# Patient Record
Sex: MALE | URBAN - METROPOLITAN AREA
[De-identification: names, ages, dates, MRNs, and addresses within clinical notes are randomized per-mention and may not be internally consistent; named-entity substitution may affect disease eponyms.]

---

## 2017-01-31 ENCOUNTER — TRANSFERRED RECORDS (OUTPATIENT)
Dept: HEALTH INFORMATION MANAGEMENT | Facility: CLINIC | Age: 42
End: 2017-01-31

## 2017-02-02 ENCOUNTER — TRANSFERRED RECORDS (OUTPATIENT)
Dept: HEALTH INFORMATION MANAGEMENT | Facility: CLINIC | Age: 42
End: 2017-02-02

## 2017-02-22 ENCOUNTER — TELEPHONE (OUTPATIENT)
Dept: PEDIATRICS | Age: 42
End: 2017-02-22

## 2017-02-22 NOTE — TELEPHONE ENCOUNTER
Spoke with Barry today, he is requesting to schedule an appointment to be tested for rhabdomyolysis. Barry had 2 recent emergency department visits because of this. I asked Barry to send us records to be reviewed prior to scheduling. He will work on getting those to me; once received we will review to determine how best to schedule.

## 2017-03-06 ENCOUNTER — TELEPHONE (OUTPATIENT)
Dept: PEDIATRICS | Age: 42
End: 2017-03-06

## 2017-03-06 NOTE — TELEPHONE ENCOUNTER
Received Emergency Department records for episodes of rhabdomyolsis. I forwarded to our [facility] nurse care coordinator for review.

## 2017-03-07 ENCOUNTER — TELEPHONE (OUTPATIENT)
Dept: PEDIATRICS | Age: 42
End: 2017-03-07

## 2017-03-07 NOTE — TELEPHONE ENCOUNTER
Left a message for Barry offering an appointment 3/08/17 or 4/19/17. Provided my direct phone number for a return call for scheduling.

## 2017-03-07 NOTE — TELEPHONE ENCOUNTER
Records have been reviewed and patient is to be scheduled in the Metabolism Clinic. I attempted to reach Barry this morning but the phone went to dead air, I will try back again this morning to get him scheduled.

## 2017-03-08 ENCOUNTER — OFFICE VISIT (OUTPATIENT)
Dept: PEDIATRICS | Facility: CLINIC | Age: 42
End: 2017-03-08
Attending: GENETIC COUNSELOR, MS
Payer: COMMERCIAL

## 2017-03-08 ENCOUNTER — OFFICE VISIT (OUTPATIENT)
Dept: PEDIATRICS | Facility: CLINIC | Age: 42
End: 2017-03-08
Attending: PEDIATRICS
Payer: COMMERCIAL

## 2017-03-08 VITALS
HEIGHT: 75 IN | WEIGHT: 210.1 LBS | SYSTOLIC BLOOD PRESSURE: 110 MMHG | DIASTOLIC BLOOD PRESSURE: 84 MMHG | HEART RATE: 84 BPM | BODY MASS INDEX: 26.12 KG/M2

## 2017-03-08 DIAGNOSIS — R11.15 NON-INTRACTABLE CYCLICAL VOMITING WITH NAUSEA: ICD-10-CM

## 2017-03-08 DIAGNOSIS — M62.82 NON-TRAUMATIC RHABDOMYOLYSIS: Primary | ICD-10-CM

## 2017-03-08 DIAGNOSIS — R11.15 CYCLICAL VOMITING: ICD-10-CM

## 2017-03-08 LAB
ALBUMIN SERPL-MCNC: 3.5 G/DL (ref 3.4–5)
ALP SERPL-CCNC: 71 U/L (ref 40–150)
ALT SERPL W P-5'-P-CCNC: 42 U/L (ref 0–70)
ANION GAP SERPL CALCULATED.3IONS-SCNC: 6 MMOL/L (ref 3–14)
AST SERPL W P-5'-P-CCNC: 37 U/L (ref 0–45)
BILIRUB SERPL-MCNC: 2.3 MG/DL (ref 0.2–1.3)
BUN SERPL-MCNC: 10 MG/DL (ref 7–30)
CALCIUM SERPL-MCNC: 8.4 MG/DL (ref 8.5–10.1)
CHLORIDE SERPL-SCNC: 109 MMOL/L (ref 94–109)
CK SERPL-CCNC: 72 U/L (ref 30–300)
CO2 SERPL-SCNC: 24 MMOL/L (ref 20–32)
CREAT SERPL-MCNC: 1.12 MG/DL (ref 0.66–1.25)
CREAT UR-MCNC: 546 MG/DL
GFR SERPL CREATININE-BSD FRML MDRD: 71 ML/MIN/1.7M2
GLUCOSE SERPL-MCNC: 105 MG/DL (ref 70–99)
POTASSIUM SERPL-SCNC: 4.3 MMOL/L (ref 3.4–5.3)
PROT SERPL-MCNC: 7.5 G/DL (ref 6.8–8.8)
SODIUM SERPL-SCNC: 139 MMOL/L (ref 133–144)
T4 FREE SERPL-MCNC: 1.13 NG/DL (ref 0.76–1.46)
TSH SERPL DL<=0.05 MIU/L-ACNC: 2.52 MU/L (ref 0.4–4)

## 2017-03-08 PROCEDURE — 82550 ASSAY OF CK (CPK): CPT | Performed by: PEDIATRICS

## 2017-03-08 PROCEDURE — 99212 OFFICE O/P EST SF 10 MIN: CPT | Mod: ZF

## 2017-03-08 PROCEDURE — 83918 ORGANIC ACIDS TOTAL QUANT: CPT | Performed by: PEDIATRICS

## 2017-03-08 PROCEDURE — 96040 ZZH GENETIC COUNSELING, EACH 30 MINUTES: CPT | Mod: ZF | Performed by: GENETIC COUNSELOR, MS

## 2017-03-08 PROCEDURE — 84443 ASSAY THYROID STIM HORMONE: CPT | Performed by: PEDIATRICS

## 2017-03-08 PROCEDURE — 84439 ASSAY OF FREE THYROXINE: CPT | Performed by: PEDIATRICS

## 2017-03-08 PROCEDURE — 36415 COLL VENOUS BLD VENIPUNCTURE: CPT | Performed by: PEDIATRICS

## 2017-03-08 PROCEDURE — 80053 COMPREHEN METABOLIC PANEL: CPT | Performed by: PEDIATRICS

## 2017-03-08 PROCEDURE — 82085 ASSAY OF ALDOLASE: CPT | Performed by: PEDIATRICS

## 2017-03-08 RX ORDER — AMLODIPINE BESYLATE 5 MG/1
10 TABLET ORAL
COMMUNITY
Start: 2016-12-01

## 2017-03-08 RX ORDER — METHYLPHENIDATE HYDROCHLORIDE 36 MG/1
36 TABLET ORAL 2 TIMES DAILY
COMMUNITY
Start: 2016-08-05

## 2017-03-08 ASSESSMENT — PAIN SCALES - GENERAL: PAINLEVEL: NO PAIN (0)

## 2017-03-08 NOTE — NURSING NOTE
"Chief Complaint   Patient presents with     Consult     Rhabdomylosis       Initial /84 (BP Location: Left arm, Patient Position: Chair, Cuff Size: Adult Large)  Pulse 84  Ht 6' 3\" (190.5 cm)  Wt 210 lb 1.6 oz (95.3 kg)  HC 59.2 cm (23.31\")  BMI 26.26 kg/m2 Estimated body mass index is 26.26 kg/(m^2) as calculated from the following:    Height as of this encounter: 6' 3\" (190.5 cm).    Weight as of this encounter: 210 lb 1.6 oz (95.3 kg).  Medication Reconciliation: complete    "

## 2017-03-08 NOTE — MR AVS SNAPSHOT
After Visit Summary   3/8/2017    Barry Jenkins    MRN: [id number]           Patient Information     Date Of Birth          1975        Visit Information        Provider Department      3/8/2017 9:00 AM Sierra Hilario GC Peds Metabolism        Today's Diagnoses     Non-traumatic rhabdomyolysis    -  1    Cyclical vomiting        Encounter for genetic counseling        Non-intractable cyclical vomiting with nausea           Follow-ups after your visit        Who to contact     Please call your clinic at [phone] to:    Ask questions about your health    Make or cancel appointments    Discuss your medicines    Learn about your test results    Speak to your doctor   If you have compliments or concerns about an experience at your clinic, or if you wish to file a complaint, please contact [facility] Patient Relations at [phone] or email us at [email or web address]         Additional Information About Your Visit        MyChart Information     LUMOback is an electronic gateway that provides easy, online access to your medical records. With LUMOback, you can request a clinic appointment, read your test results, renew a prescription or communicate with your care team.     To sign up for Fabrust visit the website at [email or web address]   You will be asked to enter the access code listed below, as well as some personal information. Please follow the directions to create your username and password.     Your access code is: [email or web address]  Expires: 2017  9:55 AM     Your access code will  in 90 days. If you need help or a new code, please contact your [facility] or call [phone] for assistance.        Care EveryWhere ID     This is your Care EveryWhere ID. This could be used by other organizations to access your [facility] medical records  [id number]         Blood Pressure from Last 3 Encounters:   17 110/84     Weight from Last 3 Encounters:   03/08/17 210 lb 1.6 oz (95.3 kg)              We Performed the Following     Next Generation Sequencing        Primary Care Provider Office Phone # Fax #    Chris Moss [facility] [phone] [address] [facility] [address]        Thank you!     Thank you for choosing [facility]  for your care. Our goal is always to provide you with excellent care. Hearing back from our patients is one way we can continue to improve our services. Please take a few minutes to complete the written survey that you may receive in the mail after your visit with us. Thank you!             Your Updated Medication List - Protect others around you: Learn how to safely use, store and throw away your medicines at www.disposemymeds.org.          This list is accurate as of: 3/8/17 11:59 PM.  Always use your most recent med list.                   Brand Name Dispense Instructions for use    amLODIPine 5 MG tablet    NORVASC     10 mg       methylphenidate ER 36 MG CR tablet    CONCERTA     36 mg 2 times daily

## 2017-03-08 NOTE — LETTER
"  3/8/2017      RE: Barry Jenkins  [address] [address]       Presenting Information:  Barry Jenkins is a 42-year-old man with two recent episodes of rhabdomyolysis.  He was referred to the Genetics & Metabolism clinic at the [facility] to determine if there is an underlying cause for this.  I met with Mr. Jenkins at the request of Dr. Mike Guerrero to obtain a personal and family history, discuss the possible genetic contributions to rhabdomyolysis, and to obtain informed consent for genetic testing.    Personal History:  Barry was born in [place] and was healthy as a child and a young adult.  He has been active most of his life, including playing college soccer.  In November of 2016 he had an episode of rhabdomyolysis requiring hospitalization.  In January of this year he had another episode, with a CK of over 17,000.  Both of these episodes occurred after weight lifting  Several days prior to each of these episodes he began taking supplements including creatine, caffeine, and casein protein.  Barry did consume alcohol after his work-out on both of these occasions.  Barry additionally has a history of frequent vomiting, occurring approximately once a month.  There is no known trigger for this.       Family History:  A detailed pedigree was obtained and scanned into the electronic medical record.  It is significant for the following:    Barry is the older of two children his parents have together.  He has a 38-year-old sister who is healthy and has one healthy son.    Barry's mother is 69-years-old.  She has a history of breast cancer diagnosed at age 66.  She is currently doing well.    Barry has a maternal aunt who is \"slow\" and does not live independently.  A specific diagnosis is not known.    Barry's father is 69-years-old.  He had a heart attack at age 65 but has recovered well from this.    There is no family history of individuals with muscle weakness, cramps, rhabdomyolysis, " or other symptoms of a possible metabolic disease.    Barry is of [place] ancestry on both sides of the family.  Consanguinity was denied.      Discussion:  We discussed that Barry's history of rhabdomyolysis as well as what sounds to be cyclic vomiting is suggestive of a possible underlying metabolic condition.  Metabolic conditions occur due to differences in how the body uses and stores energy.  These are often genetic in nature.  We reviewed that genes are long stretches of DNA that are responsible for how our bodies look and how our bodies work.  We all have two copies of each gene; one inherited from the mother and one inherited from the father.  When there is a change, called a mutation, in the DNA sequence of a gene it can cause the signs and symptoms of a genetic condition.  There are several different genetic conditions that can cause an increased risk for rhabdomyolysis.    It can be important to know if there is an underlying genetic cause for Barry's rhabdomyolysis for several reasons. First and foremost, this can be important for his own health.  If we know the cause of these episodes we can better understand the treatment for the condition to help prevent future episodes, as well as how to better manage any future episodes.  Additionally, it is possible that an underlying cause may also predispose him to other health risks.  Knowing about these additional health risks can help us stay ahead of his healthcare to more appropriately screen for other complications.  Finally, discovering an underlying reason can help predict the chance for other family members to be affected.     After evaluation by Dr. Mike Guerrero, she recommended some general metabolic labs as well as genetic testing.  Genetic testing will include 25 genes associated with rhabdomyolysis.  This will be performed at the [facility].  We reviewed the costs, limitations, and benefits of testing and  Barry provided written informed consent for this. We also discussed insurance coverage for testing and on Barry's behalf we will submit a prior authorization for this testing prior to proceeding.  I also encouraged Barry to check on his deductible and co-insurance, as even if testing approved by his insurance company he may still be responsible for a portion of the cost.  Once started, results of the testing will be available in approximately 8-12 weeks and I will contact Barry when they return.      It was a pleasure meeting with Barry.  My contact information was shared with him should he have additional questions.      Plan:  1.  Metabolic labs  2.  I will submit a prior authorization for genetic testing  3.  Once approved, rhabdomyolysis gene panel through the [facility]  4.  Follow-up as determined by the results of the above testing      Sierra Hilario MS [facility]  Genetic Counselor  Division of Genetics and Metabolism    Total time spent in consultation with the family was approximately 30 minutes    Cc: No letter      Sierra Hilario GC

## 2017-03-08 NOTE — PROGRESS NOTES
"Presenting Information:  Barry Jenkins is a 42-year-old man with two recent episodes of rhabdomyolysis.  He was referred to the Genetics & Metabolism clinic at the [facility] to determine if there is an underlying cause for this.  I met with Mr. Jenkins at the request of Dr. Mike Guerrero to obtain a personal and family history, discuss the possible genetic contributions to rhabdomyolysis, and to obtain informed consent for genetic testing.    Personal History:  Barry was born in [place] and was healthy as a child and a young adult.  He has been active most of his life, including playing college soccer.  In November of 2016 he had an episode of rhabdomyolysis requiring hospitalization.  In January of this year he had another episode, with a CK of over 17,000.  Both of these episodes occurred after weight lifting  Several days prior to each of these episodes he began taking supplements including creatine, caffeine, and casein protein.  Barry did consume alcohol after his work-out on both of these occasions.  Barry additionally has a history of frequent vomiting, occurring approximately once a month.  There is no known trigger for this.       Family History:  A detailed pedigree was obtained and scanned into the electronic medical record.  It is significant for the following:    Barry is the older of two children his parents have together.  He has a 38-year-old sister who is healthy and has one healthy son.    Barry's mother is 69-years-old.  She has a history of breast cancer diagnosed at age 66.  She is currently doing well.    Barry has a maternal aunt who is \"slow\" and does not live independently.  A specific diagnosis is not known.    Barry's father is 69-years-old.  He had a heart attack at age 65 but has recovered well from this.    There is no family history of individuals with muscle weakness, cramps, rhabdomyolysis, or other symptoms of a possible metabolic disease.    Barry is of [place] ancestry on " both sides of the family.  Consanguinity was denied.      Discussion:  We discussed that Barry's history of rhabdomyolysis as well as what sounds to be cyclic vomiting is suggestive of a possible underlying metabolic condition.  Metabolic conditions occur due to differences in how the body uses and stores energy.  These are often genetic in nature.  We reviewed that genes are long stretches of DNA that are responsible for how our bodies look and how our bodies work.  We all have two copies of each gene; one inherited from the mother and one inherited from the father.  When there is a change, called a mutation, in the DNA sequence of a gene it can cause the signs and symptoms of a genetic condition.  There are several different genetic conditions that can cause an increased risk for rhabdomyolysis.    It can be important to know if there is an underlying genetic cause for Barry's rhabdomyolysis for several reasons. First and foremost, this can be important for his own health.  If we know the cause of these episodes we can better understand the treatment for the condition to help prevent future episodes, as well as how to better manage any future episodes.  Additionally, it is possible that an underlying cause may also predispose him to other health risks.  Knowing about these additional health risks can help us stay ahead of his healthcare to more appropriately screen for other complications.  Finally, discovering an underlying reason can help predict the chance for other family members to be affected.     After evaluation by Dr. Mike Guerrero, she recommended some general metabolic labs as well as genetic testing.  Genetic testing will include 25 genes associated with rhabdomyolysis.  This will be performed at the [facility].  We reviewed the costs, limitations, and benefits of testing and Barry provided written informed consent for this. We also discussed insurance coverage  for testing and on Barry's behalf we will submit a prior authorization for this testing prior to proceeding.  I also encouraged Barry to check on his deductible and co-insurance, as even if testing approved by his insurance company he may still be responsible for a portion of the cost.  Once started, results of the testing will be available in approximately 8-12 weeks and I will contact Barry when they return.      It was a pleasure meeting with Barry.  My contact information was shared with him should he have additional questions.      Plan:  1.  Metabolic labs  2.  I will submit a prior authorization for genetic testing  3.  Once approved, rhabdomyolysis gene panel through the [facility]  4.  Follow-up as determined by the results of the above testing      Sierra Hilario MS [facility]  Genetic Counselor  Division of Genetics and Metabolism    Total time spent in consultation with the family was approximately 30 minutes    Cc: No letter

## 2017-03-08 NOTE — LETTER
Date:March 10, 2017      Provider requested that no letter be sent. Do not send.       [facility] Health Information

## 2017-03-08 NOTE — MR AVS SNAPSHOT
"              After Visit Summary   3/8/2017    Barry Jenkins    MRN: [id number]           Patient Information     Date Of Birth          1975        Visit Information        Provider Department      3/8/2017 8:30 AM Mike Guerrero MD Peds Metabolism         Follow-ups after your visit        Who to contact     Please call your clinic at [phone] to:    Ask questions about your health    Make or cancel appointments    Discuss your medicines    Learn about your test results    Speak to your doctor   If you have compliments or concerns about an experience at your clinic, or if you wish to file a complaint, please contact [facility] Patient Relations at [phone] or email us at [email or web address]         Additional Information About Your Visit        MyChart Information     Sports.ws is an electronic gateway that provides easy, online access to your medical records. With Sports.ws, you can request a clinic appointment, read your test results, renew a prescription or communicate with your care team.     To sign up for Dovot visit the website at [email or web address]   You will be asked to enter the access code listed below, as well as some personal information. Please follow the directions to create your username and password.     Your access code is: [email or web address]  Expires: 2017  9:55 AM     Your access code will  in 90 days. If you need help or a new code, please contact your [facility] or call [phone] for assistance.        Care EveryWhere ID     This is your Care EveryWhere ID. This could be used by other organizations to access your [facility] medical records  [id number]        Your Vitals Were     Pulse Height Head Circumference BMI (Body Mass Index)          84 6' 3\" (190.5 cm) 59.2 cm (23.31\") 26.26 kg/m2         Blood Pressure from Last 3 Encounters:   17 110/84    Weight from Last 3 Encounters: "   03/08/17 210 lb 1.6 oz (95.3 kg)              Today, you had the following     No orders found for display       Primary Care Provider Office Phone # Fax #    Chris Moss [facility] [phone] [address] [facility] [address]        Thank you!     Thank you for choosing [facility]  for your care. Our goal is always to provide you with excellent care. Hearing back from our patients is one way we can continue to improve our services. Please take a few minutes to complete the written survey that you may receive in the mail after your visit with us. Thank you!             Your Updated Medication List - Protect others around you: Learn how to safely use, store and throw away your medicines at www.disposemymeds.org.          This list is accurate as of: 3/8/17  9:55 AM.  Always use your most recent med list.                   Brand Name Dispense Instructions for use    amLODIPine 5 MG tablet    NORVASC     10 mg       methylphenidate ER 36 MG CR tablet    CONCERTA     36 mg 2 times daily

## 2017-03-08 NOTE — LETTER
3/8/2017      RE: Barry Jenkins  [address] [facility] [address]       Pediatric Metabolic Initial Consultation    Patient: Barry Jenkins MRN# [id number]   YOB: 1975 Age: 42 year 1 month old   Date of Visit: Mar 8, 2017    To whom it may concern,    I had the pleasure of seeing your patient, Barry Jenkins in the Pediatric Endocrinology Clinic, [facility], on Mar 8, 2017 for initial consultation regarding repeated rhabdomyolysis.           Problem list:   There are no active problems to display for this patient.  - Rhabdomyolysis  - ADHD         HPI:   Barry Jenkins is a 42 year old male with two recent episodes of rhabdomyolysis, for which he was hospitalized. The first one occurred in early November and the second one in late January.  At one of these occasions his CK was as high as 17,000.    Both episodes occurred on a Saturday night and he had been lifting weights the same morning, he also went out for a drink on both of these nights prior to the symptom debuts. On both of these events, he had started a supplement with Creatine and Caffeine just a few days earlier. After the first episode, he stopped taking the supplement with Creatine and Caffeine, partly because he was not working out during this time, and resumed it 3 months later, after which he had another episode if rhabdomyolysis.    Barry reports that he had been physically inactive for about a year prior to when the first event of rhabdomyolysis happened, and he had just started weight lifting again and was doing it every day. He had decided to start working out again due to that he had been diagnosed with hypertension in 2016 and had started a anti-hypertensive, he also states that he was overweight and wanted to lose weight. During the past months he has lost about 25 lbs. He does not take the hypertension medication anymore since he says that his blood pressure has gone down since  he started working out again. At the time of the events he was also taking Casein protein as a supplement but he has not taken any anabolic steroids or carnitine supplements.     Barry reports that he has sudden onset of nausea and vomiting about 7-8 times a year, he has not noted any triggers of these events but they are usually after a meal of lunch or dinner. No apparent correlation to alcohol intake or poor food intake.    Upon asking, Barry reports that he has frequent muscle cramps during the night and he has had that as a child as well. He does not experience muscle weakness apart from one episode where he described it to be hard to lift himself up of a chair using his arm strength, but this is something that resolved.    Barry has been physically active his whole life, he played soccer both in high school and as an adult. In his adult life, he has also done a lot of weight lifting and cardio and has never experienced this kind of problems until November of last year.    Barry has had no previous genetic testing and he believes that it is unlikely that he ever underwent  screening as he was born in [place].    I have reviewed the available past laboratory evaluations, imaging studies, and medical records available to me at this visit. I have reviewed the Barry's growth chart.    History was obtained from patient.     Birth History:   No birth history was obtained today.            Past Medical History:   - Hypertension  - Exercise induced asthma (resolved)           Past Surgical History:   - Two back surgeries for herniated disks  - LASIK eye surgery  - Umbilical hernia  - Upper arm surgery due to humerus fracture  - Ankle surgery due to fibula fracture             Social History:     Social History     Social History Narrative     No narrative on file    Barry was born in [place] but has lived in the [place] for most of his life. His immediate family also live in the [place]. He lives alone in  "[place]. 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 Barry is physically active with cardio, weight lifting, curling and lawn bowling. He does not smoke. His alcohol intake is only on the weekends and is between 5-16 units on average per week.          Family History:       History reviewed. No pertinent family history.    History of:  Adrenal insufficiency: none.  Autoimmune disease: none.  Calcium problems: none.  Delayed puberty: none.  Diabetes mellitus: none.  Early puberty: none.  Genetic disease: none.  Short stature: none.  Thyroid disease: none.    A review of family history was performed today by a genetics counselor and a pedigree is available in the chart. No history of rhabdomyolysis in the family.         Allergies:     Allergies   Allergen Reactions     Cats      Pollen Extract              Medications:     Current Outpatient Prescriptions   Medication Sig Dispense Refill     amLODIPine (NORVASC) 5 MG tablet 10 mg       methylphenidate ER (CONCERTA) 36 MG CR tablet 36 mg 2 times daily               Review of Systems:   Gen: Negative  Eye: previously nearsighted, corrected by LASIK surgery  ENT: Negative  Pulmonary: Previous asthma  Cardio: Previous hypertension  Gastrointestinal: see HPI.  Hematologic: Negative  Genitourinary: Negative  Musculoskeletal: see HPI.  Psychiatric: ADHD, on medication.  Neurologic: Negative, no seizures.  Skin: Has recently developed an eczema on behind his ears, on his eyelids and scrotum.   Endocrine: Negative            Physical Exam:   Blood pressure 110/84, pulse 84, height 6' 3\" (190.5 cm), weight 210 lb 1.6 oz (95.3 kg), head circumference 59.2 cm (23.31\").  Normalized stature-for-age data not available for patients older than 20 years.  Height: 190.5 cm  (75\") Normalized stature-for-age data not available for patients older than 20 years.  Weight: 95.3 kg (actual weight), Normalized weight-for-age data not available for patients older than 20 " years.  BMI: Body mass index is 26.26 kg/(m^2). Normalized BMI data available only for age 0 to 20 years.      Constitutional: awake, alert, cooperative, no apparent distress  Eyes: Lids and lashes normal, sclera clear, conjunctiva normal  ENT: Normocephalic, without obvious abnormality, external ears without lesions,   Neck: Supple, symmetrical, trachea midline, thyroid symmetric, not enlarged and no tenderness  Hematologic / Lymphatic: no cervical lymphadenopathy  Lungs: No increased work of breathing, clear to auscultation bilaterally with good air entry.  Cardiovascular: Regular rate and rhythm, no murmurs.  Abdomen: No scars, normal bowel sounds, soft, non-distended, non-tender, no masses palpated, no hepatosplenomegaly  Genitourinary: I deferred a genital examination.  Musculoskeletal: There is no redness, warmth, or swelling of the joints.    Neurologic: Awake, alert, oriented to name, place and time.  Neuropsychiatric: normal  Skin: no lesions  Office Visit     3/8/2017  Peds Metabolism    Schema, GENARO Esquivel   Genetic Counselor, MS    Non-traumatic rhabdomyolysis +3 more   Dx    Referred by Mike Guerrero MD   Reason for visit    Progress Notes      Presenting Information:  Barry Jenkins is a 42-year-old man with two recent episodes of rhabdomyolysis. He was referred to the Genetics & Metabolism clinic at the [facility] to determine if there is an underlying cause for this. I met with Mr. Jenkins at the request of Dr. Mike Guerrero to obtain a personal and family history, discuss the possible genetic contributions to rhabdomyolysis, and to obtain informed consent for genetic testing.     Personal History:  Barry was born in [place] and was healthy as a child and a young adult. He has been active most of his life, including playing college soccer. In November of 2016 he had an episode of rhabdomyolysis requiring hospitalization.  In January of this year he had another episode, with a CK of  "over 17,000. Both of these episodes occurred after weight lifting Several days prior to each of these episodes he began taking supplements including creatine, caffeine, and casein protein. Barry did consume alcohol after his work-out on both of these occasions. Barry additionally has a history of frequent vomiting, occurring approximately once a month. There is no known trigger for this.   Family History:  A detailed pedigree was obtained and scanned into the electronic medical record. It is significant for the following:    Barry is the older of two children his parents have together. He has a 38-year-old sister who is healthy and has one healthy son.    Barry's mother is 69-years-old. She has a history of breast cancer diagnosed at age 66. She is currently doing well.    Barry has a maternal aunt who is \"slow\" and does not live independently. A specific diagnosis is not known.    Barry's father is 69-years-old. He had a heart attack at age 65 but has recovered well from this.    There is no family history of individuals with muscle weakness, cramps, rhabdomyolysis, or other symptoms of a possible metabolic disease.    Barry is of [facility] ancestry on both sides of the family. Consanguinity was denied.      Discussion:  We discussed that Barry's history of rhabdomyolysis as well as what sounds to be cyclic vomiting is suggestive of a possible underlying metabolic condition. Metabolic conditions occur due to differences in how the body uses and stores energy. These are often genetic in nature. We reviewed that genes are long stretches of DNA that are responsible for how our bodies look and how our bodies work. We all have two copies of each gene; one inherited from the mother and one inherited from the father. When there is a change, called a mutation, in the DNA sequence of a gene it can cause the signs and symptoms of a genetic condition. There are several different genetic conditions that can cause an increased risk for " rhabdomyolysis.     It can be important to know if there is an underlying genetic cause for Barry's rhabdomyolysis for several reasons. First and foremost, this can be important for his own health.  If we know the cause of these episodes we can better understand the treatment for the condition to help prevent future episodes, as well as how to better manage any future episodes. Additionally, it is possible that an underlying cause may also predispose him to other health risks.  Knowing about these additional health risks can help us stay ahead of his healthcare to more appropriately screen for other complications.  Finally, discovering an underlying reason can help predict the chance for other family members to be affected.      After evaluation by Dr. Mike Guerrero, she recommended some general metabolic labs as well as genetic testing. Genetic testing will include 25 genes associated with rhabdomyolysis. This will be performed at the [facility]. We reviewed the costs, limitations, and benefits of testing and Barry provided written informed consent for this. We also discussed insurance coverage for testing and on Barry's behalf we will submit a prior authorization for this testing prior to proceeding. I also encouraged Barry to check on his deductible and co-insurance, as even if testing approved by his insurance company he may still be responsible for a portion of the cost. Once started, results of the testing will be available in approximately 8-12 weeks and I will contact Barry when they return.      It was a pleasure meeting with Barry. My contact information was shared with him should he have additional questions.      Plan:  1. Metabolic labs  2. I will submit a prior authorization for genetic testing  3. Once approved, rhabdomyolysis gene panel through the [facility]  4. Follow-up as determined by the results of the above  "testing        Ronit Hilario [facility]  Genetic Counselor  Division of Genetics and Metabolism                Laboratory results:     Office Visit on 03/08/2017   Component Date Value Ref Range Status     Copath Report 03/08/2017    Final                    Value:Patient Name: BRIE STEPHENSON  MR#: [id number]  Specimen #: [id number]  Collected: 3/8/2017 09:57  Received: 3/8/2017 14:06  Reported: 3/9/2017 10:52  Ordering Phy(s): CALEB ARMSTRONG  Additional Phy(s): RONIT HILARIO    For improved result formatting, select 'View Enhanced Report Format'  under Linked Documents section.  _________________________________________    TEST(S) REQUESTED:  Next Generation Sequencing-HOLD    SPECIMEN DESCRIPTION:  Blood    INTERPRETATION:    RESULTS:  EPIC order for this specimen indicates testing to be placed on hold  pending insurance approval.  Following insurance approval, clinician  needs to re-order testing in EPIC(MWC2177).    DNA processing completed.  The sample is archived for future use.    For re-order, answer \"No\" to insurance approval question and \"Yes\" to  add on within 90 days question.    Electronically Signed Out By:  VINCE     CPT Codes:    TESTING LAB LOCATION:  [address] [id number] [phone]    COLLECTION SITE:  Client:  [facility]  Location:  [facility])     Office Visit on 03/08/2017   Component Date Value Ref Range Status     Acylcarn Quant Plasma 03/08/2017   00 Final                    Value:SEE NOTE 03/10/2017 04:00 PM  Unit: not reported  (Note)    Test                             Result  Flag  Unit     RefValue  ------------------------------------------------------------------  Acylcarnitines, Quantitative, P   Acetylcarnitine, C2            6.82          nmol/mL  2.00-17.83   Acrylylcarnitine, C3:1         <0.02         nmol/mL  " <0.07   Propionylcarnitine, C3         0.22          nmol/mL  < 0.88   Formiminoglutamate, FIGLU      0.01          nmol/mL  <0.14   Iso-/Butyrylcarnitine, C4      <0.12         nmol/mL  < 0.83   Tiglylcarnitine, C5:1          0.01          nmol/mL  <0.11   Isovaleryl-/2-                 0.12          nmol/mL  < 0.51     Methylbutyrylcarn C5   3-OH-iso-/butyrylcarnitine,    0.06          nmol/mL  <0.18     C4-OH   Hexenoylcarnitine, C6:1        0.01          nmol/mL  <0.15   Hexanoylcarnitine, C6          0.02          nmol/mL  < 0.17   3-OH-isovalerylcarnitine, C5-  0.02          nmol/mL  <0.10     OH   Benzoylcarnitine               <0.01                                   nmol/mL  <0.10   Heptanoylcarnitine, C7         0.01          nmol/mL  <0.06   3-OH-hexanoylcarnitine, C6-OH  0.01          nmol/mL  < 0.09   Phenylacetylcarnitine          <0.02         nmol/mL  <0.29   Salicylcarnitine               <0.05         nmol/mL  <0.09   Octenoylcarnitine, C8:1        0.10          nmol/mL  < 0.88   Octanoylcarnitine, C8          0.12          nmol/mL  < 0.78   Malonylcarnitine, C3-DC        0.04          nmol/mL  <0.26   Decadienoylcarnitine, C10:2    <0.05         nmol/mL  <0.26   Decenoylcarnitine, C10:1       0.11          nmol/mL  < 0.47   Decanoylcarnitine, C10         0.21          nmol/mL  < 0.88   Methylmalonyl-/succinylcarn,   0.04          nmol/mL  <0.05     C4-DC   3-OH-decenoylcarnitine,        0.03          nmol/mL  <0.13     C10:1-OH   Glutarylcarnitine, C5-DC       0.06          nmol/mL  < 0.11   Dodecenoylcarnitine, C12:1     0.10          nmol/mL  < 0.35   Dodecanoylcarnitine, C12       0.09          nmol/mL  < 0.26   3-Methylglutaryl                          carnitine,     0.05          nmol/mL  <0.43     C6-DC   3-OH-dodecenoylcarnitine,      0.03          nmol/mL  <0.13     C12:1-OH   3-OH-dodecanoylcarnitine,      0.02          nmol/mL  < 0.08     C12-OH   Tetradecadienoylcarnitine,     0.04           nmol/mL  < 0.18     C14:2   Tetradecenoylcarnitine, C14:1  0.08          nmol/mL  < 0.24   Tetradecanoylcarnitine, C14    0.04          nmol/mL  < 0.12   Octanedioylcarnitine, C8-DC    0.01          nmol/mL  <0.19   3-OH-tetradecenoylcarnitine    0.02          nmol/mL  < 0.13     C14:1OH   3-OH-tetradecanoylcarnitine,   0.01          nmol/mL  < 0.08     C14-OH   Hexadecenoylcarnitine, C16:1   0.04          nmol/mL  < 0.10   Hexadecanoylcarnitine, C16     0.12          nmol/mL  < 0.23   3-OH-hexadecenoylcarnitine,    0.01          nmol/mL  < 0.06     C16:1-OH   3-OH-hexadecanoylcarnitine,    0.01          nmol/mL  < 0.06     C16-OH   Octadecadienoylcarnitine,      0.07          nmol/mL  < 0.24     C18:2   Octadecenoylcarnitine, C18:1   0.16                                    nmol/mL  < 0.39   Octadecanoylcarnitine, C18     0.05          nmol/mL  < 0.14   Dodecanedioylcarnitine, C12-   0.01          nmol/mL  <0.04     DC   3-OH-octadecadienoylcarn,      <0.02         nmol/mL  < 0.06     C18:2-OH   3-OH-octadecenoylcarnitine     0.01          nmol/mL  < 0.06     C18:1-OH   3-OH-octadecanoylcarnitine,    0.00          nmol/mL  <0.03     C18-OH   Comment (ACRN)                 SEE NOTE     In this sample, the acylcarnitine profile was normal.     This test was developed and its performance characteristics     determined by [facility] in a manner consistent with CLIA     requirements. This test has not been cleared or approved by     the U.S. Food and Drug Administration.       Test Performed by:     [address] [address]       Carnitine Free 03/08/2017 25   Final     CarnitineTotal 03/08/2017 44   Final     Carnitine Esterified 03/08/2017 19   Final     Carnitine Esterified/Free Ratio 03/08/2017 0.8   Final     CK Total 03/08/2017 72  30 - 300 U/L Final     Sodium 03/08/2017 139  133 - 144 mmol/L Final     Potassium 03/08/2017 4.3  3.4 - 5.3  mmol/L Final     Chloride 03/08/2017 109  94 - 109 mmol/L Final     Carbon Dioxide 03/08/2017 24  20 - 32 mmol/L Final     Anion Gap 03/08/2017 6  3 - 14 mmol/L Final     Glucose 03/08/2017 105* 70 - 99 mg/dL Final     Urea Nitrogen 03/08/2017 10  7 - 30 mg/dL Final     Creatinine 03/08/2017 1.12  0.66 - 1.25 mg/dL Final     GFR Estimate 03/08/2017 71  >60 mL/min/1.7m2 Final     GFR Estimate If Black 03/08/2017 86  >60 mL/min/1.7m2 Final     Calcium 03/08/2017 8.4* 8.5 - 10.1 mg/dL Final     Bilirubin Total 03/08/2017 2.3* 0.2 - 1.3 mg/dL Final     Albumin 03/08/2017 3.5  3.4 - 5.0 g/dL Final     Protein Total 03/08/2017 7.5  6.8 - 8.8 g/dL Final     Alkaline Phosphatase 03/08/2017 71  40 - 150 U/L Final     ALT 03/08/2017 42  0 - 70 U/L Final     AST 03/08/2017 37  0 - 45 U/L Final     T4 Free 03/08/2017 1.13  0.76 - 1.46 ng/dL Final     TSH 03/08/2017 2.52  0.40 - 4.00 mU/L Final     2-Keto Glutaric Urine 03/08/2017 Negative  0 - 476 ug/mg Cr Final     2-Keto Isocaproic Urine 03/08/2017 Negative  0 - 4 ug/mg cr Final     2-OH Butyric Urine 03/08/2017 Negative  0 - 4 ug/mg Cr Final     2-OH Glutaric Urine 03/08/2017 Negative  0 - 20 ug/mg cr Final     2-OH Isocaproic Urine 03/08/2017 Negative  0 - 4 ug/mg cr Final     3ME Crotonylglyc Urine 03/08/2017 Negative  0 - 4 ug/mg cr Final     3-OH 3ME Glutaric Urine 03/08/2017 Negative  0 - 40 ug/mg cr Final     3-OH Butyric Urine 03/08/2017 Negative  0 - 15 ug/mg Cr Final     3-OH Glutaric Urine 03/08/2017 Negative  0 - 4 ug/mg cr Final     3-OH Isovaleric Urine 03/08/2017 Negative  0 - 50 ug/mg cr Final     3-OH Propionic Urine 03/08/2017 Negative  0 - 4 ug/mg cr Final     4-OH Butyric Urine 03/08/2017 Negative  0 - 4 ug/mg cr Final     5-OH Hexanoic Urine 03/08/2017 Negative  0 - 6 ug/mg cr Final     7-OH Octanoic Urine 03/08/2017 Negative  0 - 4 ug/mg cr Final     Acetoacetic Urine 03/08/2017 Negative  0 - 6 ug/mg Cr Final     Adipic Urine 03/08/2017 Negative  0 - 29  ug/mg Cr Final     Citric Urine 03/08/2017 Negative  0 - 1500 ug/mg cr Final     Ethylmalonic Urine 03/08/2017 Negative  0 - 21 ug/mg Cr Final     Fumaric Urine 03/08/2017 Negative  0 - 10 ug/mg Cr Final     Glutaric Urine 03/08/2017 Negative  0 - 6 ug/mg cr Final     Glyceric Urine 03/08/2017 Negative  0 - 4 ug/mg Cr Final     Glyoxylic Urine 03/08/2017 Negative  0 - 59 ug/mg Cr Final     Hexanoylglycine Urine 03/08/2017 Negative  0 - 4 ug/mg cr Final     Isovalerylglyc Urine 03/08/2017 Negative  0 - 10 ug/mg cr Final     Isocitric Urine 03/08/2017 Negative  0 - 140 ug/mg cr Final     Lactic Urine 03/08/2017 Negative  0 - 132 ug/mg Cr Final     Methyl Citric Urine 03/08/2017 Negative  0 - 4 ug/mg cr Final     Methyl Malonic Urine 03/08/2017 Negative  0 - 14 ug/mg Cr Final     N-Acetylaspartic Urine 03/08/2017 Negative  0 - 4 ug/mg cr Final     Oxalic Urine 03/08/2017 Negative  0 - 300 ug/mg cr Final     Phenylacetic Urine 03/08/2017 Negative  0 - 4 ug/mg cr Final     Phenyllactic Urine 03/08/2017 Negative  0 - 4 ug/mg cr Final     Phenylpropglyc Urine 03/08/2017 Negative  0 - 4 ug/mg cr Final     Phenylpyruvic Urine 03/08/2017 Negative  0 - 4 ug/mg cr Final     Pyroglutamic Urine 03/08/2017 Negative  0 - 70 ug/mg Cr Final     P-OH Phenylacetic Urine 03/08/2017 Negative  0 - 325 ug/mg cr Final     P-OH Phenyllactic Urine 03/08/2017 Negative  0 - 15 ug/mg Cr Final     P-OH Phenylpyruvic Urine 03/08/2017 Negative  0 - 28 ug/mg Cr Final     Propionylglycine Urine 03/08/2017 Negative  0 - 4 ug/mg cr Final     Pyruvic Urine 03/08/2017 Negative  0 - 40 ug/mg Cr Final     Sebacic Urine 03/08/2017 Negative  0 - 4 ug/mg cr Final     Suberic Urine 03/08/2017 Negative  0 - 19 ug/mg Cr Final     Suberylglycine Urine 03/08/2017 Negative  0 - 4 ug/mg cr Final     Succinic Urine 03/08/2017 Negative  0 - 120 ug/mg Cr Final     Tiglyglycine Urine 03/08/2017 Negative  0 - 10 ug/mg Cr Final     Aldolase 03/08/2017 4.4   Final      Creatinine Urine 03/08/2017 546  mg/dL Final     ]         Assessment and Plan:   Barry is a 42 year old who came to clinic due to two episodes of rhabdomyolysis within the last 6 months. [place] [address] raises concern for a metabolic and/or genetic cause of the repeated rhabdomyolysis.Therefore, we will obtain genetic testing for the genes associated with rhabdomyolysis as wells as the following labs:     Orders Placed This Encounter   Procedures     Acylcarnitines plasma quantitative     Carnitine free and total     CK total     Comprehensive metabolic panel     T4 free     TSH     Organic acid comprehensive urine with interpretation     Aldolase     Review of his labs showed normal CK, normal carnitine level, normal plasma acylcarnitine, normmal thyroid function tests, normal urine organic and normal aldolase. Follow-up when the molecular testing  results of the rhabdomyolysis panel are available.    Thank you for allowing me to participate in the care of your patient.  Please do not hesitate to call with questions or concerns.    IShannon acted as a scribe for Mike Guerrero MD.    Sincerely,    I personally performed the entire clinical encounter documented in this note.    Mike Guerrero M.D.  [facility]  Division of Pediatric Endocrinology  Division of Genetics & Metabolism  Pager: [phone]      CC  Patient Care Team:  Clinic, Chris Hale as PCP - General  Mike Guerrero MD as MD (Pediatric Metabolism)  \[facility] [place] [facility]\""    Copy to patient  TRI STEPHENSON SEEMA ALEXA  [address] [facility] [id number]

## 2017-03-08 NOTE — PROGRESS NOTES
Pediatric Metabolic Initial Consultation    Patient: Barry Jenkins MRN# [id number]   YOB: 1975 Age: 42 year 1 month old   Date of Visit: Mar 8, 2017    To whom it may concern,    I had the pleasure of seeing your patient, Barry Jenkins in the Pediatric Endocrinology Clinic, [facility], on Mar 8, 2017 for initial consultation regarding repeated rhabdomyolysis.           Problem list:   There are no active problems to display for this patient.  - Rhabdomyolysis  - ADHD         HPI:   Barry Jenkins is a 42 year old male with two recent episodes of rhabdomyolysis, for which he was hospitalized. The first one occurred in early November and the second one in late January.  At one of these occasions his CK was as high as 17,000.    Both episodes occurred on a Saturday night and he had been lifting weights the same morning, he also went out for a drink on both of these nights prior to the symptom debuts. On both of these events, he had started a supplement with Creatine and Caffeine just a few days earlier. After the first episode, he stopped taking the supplement with Creatine and Caffeine, partly because he was not working out during this time, and resumed it 3 months later, after which he had another episode if rhabdomyolysis.    Barry reports that he had been physically inactive for about a year prior to when the first event of rhabdomyolysis happened, and he had just started weight lifting again and was doing it every day. He had decided to start working out again due to that he had been diagnosed with hypertension in 2016 and had started a anti-hypertensive, he also states that he was overweight and wanted to lose weight. During the past months he has lost about 25 lbs. He does not take the hypertension medication anymore since he says that his blood pressure has gone down since he started working out again. At the time of the events he was also taking Casein  protein as a supplement but he has not taken any anabolic steroids or carnitine supplements.     Barry reports that he has sudden onset of nausea and vomiting about 7-8 times a year, he has not noted any triggers of these events but they are usually after a meal of lunch or dinner. No apparent correlation to alcohol intake or poor food intake.    Upon asking, Barry reports that he has frequent muscle cramps during the night and he has had that as a child as well. He does not experience muscle weakness apart from one episode where he described it to be hard to lift himself up of a chair using his arm strength, but this is something that resolved.    Barry has been physically active his whole life, he played soccer both in high school and as an adult. In his adult life, he has also done a lot of weight lifting and cardio and has never experienced this kind of problems until November of last year.    Barry has had no previous genetic testing and he believes that it is unlikely that he ever underwent  screening as he was born in [place].    I have reviewed the available past laboratory evaluations, imaging studies, and medical records available to me at this visit. I have reviewed the Barry's growth chart.    History was obtained from patient.     Birth History:   No birth history was obtained today.            Past Medical History:   - Hypertension  - Exercise induced asthma (resolved)           Past Surgical History:   - Two back surgeries for herniated disks  - LASIK eye surgery  - Umbilical hernia  - Upper arm surgery due to humerus fracture  - Ankle surgery due to fibula fracture             Social History:     Social History     Social History Narrative     No narrative on file    Barry was born in [place] but has lived in the [place] for most of his life. His immediate family also live in the [place]. He lives alone in [place]. 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 Barry is  "physically active with cardio, weight lifting, curling and lawn bowling. He does not smoke. His alcohol intake is only on the weekends and is between 5-16 units on average per week.          Family History:       History reviewed. No pertinent family history.    History of:  Adrenal insufficiency: none.  Autoimmune disease: none.  Calcium problems: none.  Delayed puberty: none.  Diabetes mellitus: none.  Early puberty: none.  Genetic disease: none.  Short stature: none.  Thyroid disease: none.    A review of family history was performed today by a genetics counselor and a pedigree is available in the chart. No history of rhabdomyolysis in the family.         Allergies:     Allergies   Allergen Reactions     Cats      Pollen Extract              Medications:     Current Outpatient Prescriptions   Medication Sig Dispense Refill     amLODIPine (NORVASC) 5 MG tablet 10 mg       methylphenidate ER (CONCERTA) 36 MG CR tablet 36 mg 2 times daily               Review of Systems:   Gen: Negative  Eye: previously nearsighted, corrected by LASIK surgery  ENT: Negative  Pulmonary: Previous asthma  Cardio: Previous hypertension  Gastrointestinal: see HPI.  Hematologic: Negative  Genitourinary: Negative  Musculoskeletal: see HPI.  Psychiatric: ADHD, on medication.  Neurologic: Negative, no seizures.  Skin: Has recently developed an eczema on behind his ears, on his eyelids and scrotum.   Endocrine: Negative            Physical Exam:   Blood pressure 110/84, pulse 84, height 6' 3\" (190.5 cm), weight 210 lb 1.6 oz (95.3 kg), head circumference 59.2 cm (23.31\").  Normalized stature-for-age data not available for patients older than 20 years.  Height: 190.5 cm  (75\") Normalized stature-for-age data not available for patients older than 20 years.  Weight: 95.3 kg (actual weight), Normalized weight-for-age data not available for patients older than 20 years.  BMI: Body mass index is 26.26 kg/(m^2). Normalized BMI data available only " for age 0 to 20 years.      Constitutional: awake, alert, cooperative, no apparent distress  Eyes: Lids and lashes normal, sclera clear, conjunctiva normal  ENT: Normocephalic, without obvious abnormality, external ears without lesions,   Neck: Supple, symmetrical, trachea midline, thyroid symmetric, not enlarged and no tenderness  Hematologic / Lymphatic: no cervical lymphadenopathy  Lungs: No increased work of breathing, clear to auscultation bilaterally with good air entry.  Cardiovascular: Regular rate and rhythm, no murmurs.  Abdomen: No scars, normal bowel sounds, soft, non-distended, non-tender, no masses palpated, no hepatosplenomegaly  Genitourinary: I deferred a genital examination.  Musculoskeletal: There is no redness, warmth, or swelling of the joints.    Neurologic: Awake, alert, oriented to name, place and time.  Neuropsychiatric: normal  Skin: no lesions  Office Visit     3/8/2017  Peds Metabolism    Schema, GENARO Esquivel   Genetic Counselor, MS    Non-traumatic rhabdomyolysis +3 more   Dx    Referred by Mike Guerrero MD   Reason for visit    Progress Notes      Presenting Information:  Barry Jenkins is a 42-year-old man with two recent episodes of rhabdomyolysis. He was referred to the Genetics & Metabolism clinic at the [facility] to determine if there is an underlying cause for this. I met with Mr. Jenkins at the request of Dr. Mike Guerrero to obtain a personal and family history, discuss the possible genetic contributions to rhabdomyolysis, and to obtain informed consent for genetic testing.     Personal History:  Barry was born in [place] and was healthy as a child and a young adult. He has been active most of his life, including playing college soccer. In November of 2016 he had an episode of rhabdomyolysis requiring hospitalization.  In January of this year he had another episode, with a CK of over 17,000. Both of these episodes occurred after weight lifting Several days prior  "to each of these episodes he began taking supplements including creatine, caffeine, and casein protein. Barry did consume alcohol after his work-out on both of these occasions. Barry additionally has a history of frequent vomiting, occurring approximately once a month. There is no known trigger for this.   Family History:  A detailed pedigree was obtained and scanned into the electronic medical record. It is significant for the following:    Barry is the older of two children his parents have together. He has a 38-year-old sister who is healthy and has one healthy son.    Barry's mother is 69-years-old. She has a history of breast cancer diagnosed at age 66. She is currently doing well.    Barry has a maternal aunt who is \"slow\" and does not live independently. A specific diagnosis is not known.    Barry's father is 69-years-old. He had a heart attack at age 65 but has recovered well from this.    There is no family history of individuals with muscle weakness, cramps, rhabdomyolysis, or other symptoms of a possible metabolic disease.    Barry is of [facility] ancestry on both sides of the family. Consanguinity was denied.      Discussion:  We discussed that Barry's history of rhabdomyolysis as well as what sounds to be cyclic vomiting is suggestive of a possible underlying metabolic condition. Metabolic conditions occur due to differences in how the body uses and stores energy. These are often genetic in nature. We reviewed that genes are long stretches of DNA that are responsible for how our bodies look and how our bodies work. We all have two copies of each gene; one inherited from the mother and one inherited from the father. When there is a change, called a mutation, in the DNA sequence of a gene it can cause the signs and symptoms of a genetic condition. There are several different genetic conditions that can cause an increased risk for rhabdomyolysis.     It can be important to know if there is an underlying genetic " cause for Barry's rhabdomyolysis for several reasons. First and foremost, this can be important for his own health.  If we know the cause of these episodes we can better understand the treatment for the condition to help prevent future episodes, as well as how to better manage any future episodes. Additionally, it is possible that an underlying cause may also predispose him to other health risks.  Knowing about these additional health risks can help us stay ahead of his healthcare to more appropriately screen for other complications.  Finally, discovering an underlying reason can help predict the chance for other family members to be affected.      After evaluation by Dr. Mike Guerrero, she recommended some general metabolic labs as well as genetic testing. Genetic testing will include 25 genes associated with rhabdomyolysis. This will be performed at the [facility]. We reviewed the costs, limitations, and benefits of testing and Barry provided written informed consent for this. We also discussed insurance coverage for testing and on Barry's behalf we will submit a prior authorization for this testing prior to proceeding. I also encouraged Barry to check on his deductible and co-insurance, as even if testing approved by his insurance company he may still be responsible for a portion of the cost. Once started, results of the testing will be available in approximately 8-12 weeks and I will contact Barry when they return.      It was a pleasure meeting with Barry. My contact information was shared with him should he have additional questions.      Plan:  1. Metabolic labs  2. I will submit a prior authorization for genetic testing  3. Once approved, rhabdomyolysis gene panel through the [facility]  4. Follow-up as determined by the results of the above testing        Sierra Hilario MS [facility]  Genetic Counselor  Division of Genetics and Metabolism     "            Laboratory results:     Office Visit on 03/08/2017   Component Date Value Ref Range Status     Copath Report 03/08/2017    Final                    Value:Patient Name: BRIE STEPHENSON  MR#: [id number]  Specimen #: [id number]  Collected: 3/8/2017 09:57  Received: 3/8/2017 14:06  Reported: 3/9/2017 10:52  Ordering Phy(s): CALEB ARMSTRONG  Additional Phy(s): RONIT JIANG    For improved result formatting, select 'View Enhanced Report Format'  under Linked Documents section.  _________________________________________    TEST(S) REQUESTED:  Next Generation Sequencing-HOLD    SPECIMEN DESCRIPTION:  Blood    INTERPRETATION:    RESULTS:  EPIC order for this specimen indicates testing to be placed on hold  pending insurance approval.  Following insurance approval, clinician  needs to re-order testing in EPIC(ESM8278).    DNA processing completed.  The sample is archived for future use.    For re-order, answer \"No\" to insurance approval question and \"Yes\" to  add on within 90 days question.    Electronically Signed Out By:  VINCE     CPT Codes:    TESTING LAB LOCATION:  [address] [address] [phone]    COLLECTION SITE:  Client:  [facility]  Location:  [facility])     Office Visit on 03/08/2017   Component Date Value Ref Range Status     Acylcarn Quant Plasma 03/08/2017   00 Final                    Value:SEE NOTE 03/10/2017 04:00 PM  Unit: not reported  (Note)    Test                             Result  Flag  Unit     RefValue  ------------------------------------------------------------------  Acylcarnitines, Quantitative, P   Acetylcarnitine, C2            6.82          nmol/mL  2.00-17.83   Acrylylcarnitine, C3:1         <0.02         nmol/mL  <0.07   Propionylcarnitine, C3         0.22          nmol/mL  < 0.88   Formiminoglutamate, FIGLU "      0.01          nmol/mL  <0.14   Iso-/Butyrylcarnitine, C4      <0.12         nmol/mL  < 0.83   Tiglylcarnitine, C5:1          0.01          nmol/mL  <0.11   Isovaleryl-/2-                 0.12          nmol/mL  < 0.51     Methylbutyrylcarn C5   3-OH-iso-/butyrylcarnitine,    0.06          nmol/mL  <0.18     C4-OH   Hexenoylcarnitine, C6:1        0.01          nmol/mL  <0.15   Hexanoylcarnitine, C6          0.02          nmol/mL  < 0.17   3-OH-isovalerylcarnitine, C5-  0.02          nmol/mL  <0.10     OH   Benzoylcarnitine               <0.01                                   nmol/mL  <0.10   Heptanoylcarnitine, C7         0.01          nmol/mL  <0.06   3-OH-hexanoylcarnitine, C6-OH  0.01          nmol/mL  < 0.09   Phenylacetylcarnitine          <0.02         nmol/mL  <0.29   Salicylcarnitine               <0.05         nmol/mL  <0.09   Octenoylcarnitine, C8:1        0.10          nmol/mL  < 0.88   Octanoylcarnitine, C8          0.12          nmol/mL  < 0.78   Malonylcarnitine, C3-DC        0.04          nmol/mL  <0.26   Decadienoylcarnitine, C10:2    <0.05         nmol/mL  <0.26   Decenoylcarnitine, C10:1       0.11          nmol/mL  < 0.47   Decanoylcarnitine, C10         0.21          nmol/mL  < 0.88   Methylmalonyl-/succinylcarn,   0.04          nmol/mL  <0.05     C4-DC   3-OH-decenoylcarnitine,        0.03          nmol/mL  <0.13     C10:1-OH   Glutarylcarnitine, C5-DC       0.06          nmol/mL  < 0.11   Dodecenoylcarnitine, C12:1     0.10          nmol/mL  < 0.35   Dodecanoylcarnitine, C12       0.09          nmol/mL  < 0.26   3-Methylglutaryl                          carnitine,     0.05          nmol/mL  <0.43     C6-DC   3-OH-dodecenoylcarnitine,      0.03          nmol/mL  <0.13     C12:1-OH   3-OH-dodecanoylcarnitine,      0.02          nmol/mL  < 0.08     C12-OH   Tetradecadienoylcarnitine,     0.04          nmol/mL  < 0.18     C14:2   Tetradecenoylcarnitine, C14:1  0.08          nmol/mL  < 0.24    Tetradecanoylcarnitine, C14    0.04          nmol/mL  < 0.12   Octanedioylcarnitine, C8-DC    0.01          nmol/mL  <0.19   3-OH-tetradecenoylcarnitine    0.02          nmol/mL  < 0.13     C14:1OH   3-OH-tetradecanoylcarnitine,   0.01          nmol/mL  < 0.08     C14-OH   Hexadecenoylcarnitine, C16:1   0.04          nmol/mL  < 0.10   Hexadecanoylcarnitine, C16     0.12          nmol/mL  < 0.23   3-OH-hexadecenoylcarnitine,    0.01          nmol/mL  < 0.06     C16:1-OH   3-OH-hexadecanoylcarnitine,    0.01          nmol/mL  < 0.06     C16-OH   Octadecadienoylcarnitine,      0.07          nmol/mL  < 0.24     C18:2   Octadecenoylcarnitine, C18:1   0.16                                    nmol/mL  < 0.39   Octadecanoylcarnitine, C18     0.05          nmol/mL  < 0.14   Dodecanedioylcarnitine, C12-   0.01          nmol/mL  <0.04     DC   3-OH-octadecadienoylcarn,      <0.02         nmol/mL  < 0.06     C18:2-OH   3-OH-octadecenoylcarnitine     0.01          nmol/mL  < 0.06     C18:1-OH   3-OH-octadecanoylcarnitine,    0.00          nmol/mL  <0.03     C18-OH   Comment (ACRN)                 SEE NOTE     In this sample, the acylcarnitine profile was normal.     This test was developed and its performance characteristics     determined by [facility] in a manner consistent with CLIA     requirements. This test has not been cleared or approved by     the U.S. Food and Drug Administration.       Test Performed by:     [address] [address]       Carnitine Free 03/08/2017 25   Final     CarnitineTotal 03/08/2017 44   Final     Carnitine Esterified 03/08/2017 19   Final     Carnitine Esterified/Free Ratio 03/08/2017 0.8   Final     CK Total 03/08/2017 72  30 - 300 U/L Final     Sodium 03/08/2017 139  133 - 144 mmol/L Final     Potassium 03/08/2017 4.3  3.4 - 5.3 mmol/L Final     Chloride 03/08/2017 109  94 - 109 mmol/L Final     Carbon Dioxide 03/08/2017 24   20 - 32 mmol/L Final     Anion Gap 03/08/2017 6  3 - 14 mmol/L Final     Glucose 03/08/2017 105* 70 - 99 mg/dL Final     Urea Nitrogen 03/08/2017 10  7 - 30 mg/dL Final     Creatinine 03/08/2017 1.12  0.66 - 1.25 mg/dL Final     GFR Estimate 03/08/2017 71  >60 mL/min/1.7m2 Final     GFR Estimate If Black 03/08/2017 86  >60 mL/min/1.7m2 Final     Calcium 03/08/2017 8.4* 8.5 - 10.1 mg/dL Final     Bilirubin Total 03/08/2017 2.3* 0.2 - 1.3 mg/dL Final     Albumin 03/08/2017 3.5  3.4 - 5.0 g/dL Final     Protein Total 03/08/2017 7.5  6.8 - 8.8 g/dL Final     Alkaline Phosphatase 03/08/2017 71  40 - 150 U/L Final     ALT 03/08/2017 42  0 - 70 U/L Final     AST 03/08/2017 37  0 - 45 U/L Final     T4 Free 03/08/2017 1.13  0.76 - 1.46 ng/dL Final     TSH 03/08/2017 2.52  0.40 - 4.00 mU/L Final     2-Keto Glutaric Urine 03/08/2017 Negative  0 - 476 ug/mg Cr Final     2-Keto Isocaproic Urine 03/08/2017 Negative  0 - 4 ug/mg cr Final     2-OH Butyric Urine 03/08/2017 Negative  0 - 4 ug/mg Cr Final     2-OH Glutaric Urine 03/08/2017 Negative  0 - 20 ug/mg cr Final     2-OH Isocaproic Urine 03/08/2017 Negative  0 - 4 ug/mg cr Final     3ME Crotonylglyc Urine 03/08/2017 Negative  0 - 4 ug/mg cr Final     3-OH 3ME Glutaric Urine 03/08/2017 Negative  0 - 40 ug/mg cr Final     3-OH Butyric Urine 03/08/2017 Negative  0 - 15 ug/mg Cr Final     3-OH Glutaric Urine 03/08/2017 Negative  0 - 4 ug/mg cr Final     3-OH Isovaleric Urine 03/08/2017 Negative  0 - 50 ug/mg cr Final     3-OH Propionic Urine 03/08/2017 Negative  0 - 4 ug/mg cr Final     4-OH Butyric Urine 03/08/2017 Negative  0 - 4 ug/mg cr Final     5-OH Hexanoic Urine 03/08/2017 Negative  0 - 6 ug/mg cr Final     7-OH Octanoic Urine 03/08/2017 Negative  0 - 4 ug/mg cr Final     Acetoacetic Urine 03/08/2017 Negative  0 - 6 ug/mg Cr Final     Adipic Urine 03/08/2017 Negative  0 - 29 ug/mg Cr Final     Citric Urine 03/08/2017 Negative  0 - 1500 ug/mg cr Final     Ethylmalonic  Urine 03/08/2017 Negative  0 - 21 ug/mg Cr Final     Fumaric Urine 03/08/2017 Negative  0 - 10 ug/mg Cr Final     Glutaric Urine 03/08/2017 Negative  0 - 6 ug/mg cr Final     Glyceric Urine 03/08/2017 Negative  0 - 4 ug/mg Cr Final     Glyoxylic Urine 03/08/2017 Negative  0 - 59 ug/mg Cr Final     Hexanoylglycine Urine 03/08/2017 Negative  0 - 4 ug/mg cr Final     Isovalerylglyc Urine 03/08/2017 Negative  0 - 10 ug/mg cr Final     Isocitric Urine 03/08/2017 Negative  0 - 140 ug/mg cr Final     Lactic Urine 03/08/2017 Negative  0 - 132 ug/mg Cr Final     Methyl Citric Urine 03/08/2017 Negative  0 - 4 ug/mg cr Final     Methyl Malonic Urine 03/08/2017 Negative  0 - 14 ug/mg Cr Final     N-Acetylaspartic Urine 03/08/2017 Negative  0 - 4 ug/mg cr Final     Oxalic Urine 03/08/2017 Negative  0 - 300 ug/mg cr Final     Phenylacetic Urine 03/08/2017 Negative  0 - 4 ug/mg cr Final     Phenyllactic Urine 03/08/2017 Negative  0 - 4 ug/mg cr Final     Phenylpropglyc Urine 03/08/2017 Negative  0 - 4 ug/mg cr Final     Phenylpyruvic Urine 03/08/2017 Negative  0 - 4 ug/mg cr Final     Pyroglutamic Urine 03/08/2017 Negative  0 - 70 ug/mg Cr Final     P-OH Phenylacetic Urine 03/08/2017 Negative  0 - 325 ug/mg cr Final     P-OH Phenyllactic Urine 03/08/2017 Negative  0 - 15 ug/mg Cr Final     P-OH Phenylpyruvic Urine 03/08/2017 Negative  0 - 28 ug/mg Cr Final     Propionylglycine Urine 03/08/2017 Negative  0 - 4 ug/mg cr Final     Pyruvic Urine 03/08/2017 Negative  0 - 40 ug/mg Cr Final     Sebacic Urine 03/08/2017 Negative  0 - 4 ug/mg cr Final     Suberic Urine 03/08/2017 Negative  0 - 19 ug/mg Cr Final     Suberylglycine Urine 03/08/2017 Negative  0 - 4 ug/mg cr Final     Succinic Urine 03/08/2017 Negative  0 - 120 ug/mg Cr Final     Tiglyglycine Urine 03/08/2017 Negative  0 - 10 ug/mg Cr Final     Aldolase 03/08/2017 4.4   Final     Creatinine Urine 03/08/2017 546  mg/dL Final     ]         Assessment and Plan:   Barry Kaur  year old who came to clinic due to two episodes of rhabdomyolysis within the last 6 months. [place] [address] raises concern for a metabolic and/or genetic cause of the repeated rhabdomyolysis.Therefore, we will obtain genetic testing for the genes associated with rhabdomyolysis as wells as the following labs:     Orders Placed This Encounter   Procedures     Acylcarnitines plasma quantitative     Carnitine free and total     CK total     Comprehensive metabolic panel     T4 free     TSH     Organic acid comprehensive urine with interpretation     Aldolase     Review of his labs showed normal CK, normal carnitine level, normal plasma acylcarnitine, normmal thyroid function tests, normal urine organic and normal aldolase. Follow-up when the molecular testing  results of the rhabdomyolysis panel are available.    Thank you for allowing me to participate in the care of your patient.  Please do not hesitate to call with questions or concerns.    IShannon acted as a scribe for Mike Guerrero MD.    Sincerely,    I personally performed the entire clinical encounter documented in this note.    Mike Guerrero M.D.  [facility]  Division of Pediatric Endocrinology  Division of Genetics & Metabolism  Pager: [phone]      CC  Patient Care Team:  Clinic, Chris Hale as PCP - General  Mike Guerrero MD as MD (Pediatric Metabolism)  [facility]    Copy to patient  STEPHENSONCHAYATRIALEXA MAYS  [address] [facility] [address]

## 2017-03-09 LAB
ALDOLASE SERPL-CCNC: 4.4
COPATH REPORT: NORMAL

## 2017-03-10 LAB
2ME-CITRATE/CREAT UR: NEGATIVE UG/MG CR (ref 0–4)
2OH-ISOCAPROATE/CREAT UR: NEGATIVE UG/MG CR (ref 0–4)
3-OH 3ME GLUTARIC, UR: NEGATIVE UG/MG CR (ref 0–40)
3ME-CROTONYLGLYCINE/CREAT UR: NEGATIVE UG/MG CR (ref 0–4)
3OH-ISOVALERATE/CREAT UR: NEGATIVE UG/MG CR (ref 0–50)
3OH-PROPIONATE/CREAT UR: NEGATIVE UG/MG CR (ref 0–4)
4OH-PHENYLLACTATE/CREAT UR-RTO: NEGATIVE UG/MG CR (ref 0–15)
4OH-PHENYLPYRUVATE/CREAT UR-SRTO: NEGATIVE UG/MG CR (ref 0–28)
5OH-HEXANOATE/CREAT UR: NEGATIVE UG/MG CR (ref 0–6)
5OXOPROLINE/CREAT UR: NEGATIVE UG/MG CR (ref 0–70)
7OH-OCTANOATE/CREAT UR-SRTO: NEGATIVE UG/MG CR (ref 0–4)
A-KETOGLUT/CREAT UR: NEGATIVE UG/MG CR (ref 0–476)
A-OH-BUTYR/CREAT UR: NEGATIVE UG/MG CR (ref 0–4)
ACETOACET/CREAT UR: NEGATIVE UG/MG CR (ref 0–6)
ACYLCARNITINE PATTERN SERPL-IMP: NORMAL 00
ADIPATE/CREAT UR: NEGATIVE UG/MG CR (ref 0–29)
B-OH-BUTYR/CREAT UR: NEGATIVE UG/MG CR (ref 0–15)
CITRATE/CREAT UR: NEGATIVE UG/MG CR (ref 0–1500)
DEPRECATED N-AC-ASP/CREAT UR: NEGATIVE UG/MG CR (ref 0–4)
ETHYLMALONATE/CREAT 24H UR: NEGATIVE UG/MG CR (ref 0–21)
FUMARATE/CREAT UR: NEGATIVE UG/MG CR (ref 0–10)
G-OH-BUTYR/CREAT UR: NEGATIVE UG/MG CR (ref 0–4)
GLUTARATE/CREAT UR: NEGATIVE UG/MG CR (ref 0–20)
GLUTARATE/CREAT UR: NEGATIVE UG/MG CR (ref 0–4)
GLUTARATE/CREAT UR: NEGATIVE UG/MG CR (ref 0–6)
GLYCERATE/CREAT UR: NEGATIVE UG/MG CR (ref 0–4)
GLYOXYLATE/CREAT UR: NEGATIVE UG/MG CR (ref 0–59)
HEXANOYLGLY/CREAT UR: NEGATIVE UG/MG CR (ref 0–4)
ISOCITRATE/CREAT UR: NEGATIVE UG/MG CR (ref 0–140)
ISOVALERYLGLY/CREAT UR: NEGATIVE UG/MG CR (ref 0–10)
LACTATE/CREAT UR: NEGATIVE UG/MG CR (ref 0–132)
METHYLMALONATE/CREAT UR: NEGATIVE UG/MG CR (ref 0–14)
ORGANIC ACIDS UR-MCNC: NEGATIVE UG/MG CR (ref 0–4)
OXALATE/CREAT UR: NEGATIVE UG/MG CR (ref 0–300)
PHENYLACETATE/CREAT UR-SRTO: NEGATIVE UG/MG CR (ref 0–4)
PHENYLACETATE/CREAT UR: NEGATIVE UG/MG CR (ref 0–325)
PHENYLLACTATE/CREAT UR: NEGATIVE UG/MG CR (ref 0–4)
PHENYLPYRUVATE/CREAT UR: NEGATIVE UG/MG CR (ref 0–4)
PPG/CREAT UR: NEGATIVE UG/MG CR (ref 0–4)
PROPIONYLGLY/CREAT UR: NEGATIVE UG/MG CR (ref 0–4)
PYRUVATE/CREAT UR: NEGATIVE UG/MG CR (ref 0–40)
SEBACATE/CREAT UR: NEGATIVE UG/MG CR (ref 0–4)
SUBERATE/CREAT UR: NEGATIVE UG/MG CR (ref 0–19)
SUBERYLGLY/CREAT UR: NEGATIVE UG/MG CR (ref 0–4)
SUCCINATE/CREAT UR: NEGATIVE UG/MG CR (ref 0–120)
TIGLYLGLY/CREAT UR: NEGATIVE UG/MG CR (ref 0–10)

## 2017-03-11 LAB
ACYLCARNITINE SERPL-SCNC: 19 UMOL/L
CARN ESTERS/C0 SERPL-SRTO: 0.8 {RATIO}
CARNITINE FREE SERPL-SCNC: 25 UMOL/L
CARNITINE SERPL-SCNC: 44 UMOL/L

## 2017-03-15 ENCOUNTER — TELEPHONE (OUTPATIENT)
Dept: CONSULT | Facility: CLINIC | Age: 42
End: 2017-03-15

## 2017-03-15 ENCOUNTER — HOSPITAL ENCOUNTER (OUTPATIENT)
Facility: CLINIC | Age: 42
Setting detail: SPECIMEN
Discharge: HOME | End: 2017-03-15
Admitting: PEDIATRICS
Payer: COMMERCIAL

## 2017-03-15 DIAGNOSIS — R11.15 NON-INTRACTABLE CYCLICAL VOMITING WITH NAUSEA: Primary | ICD-10-CM

## 2017-03-15 DIAGNOSIS — M62.82 NON-TRAUMATIC RHABDOMYOLYSIS: ICD-10-CM

## 2017-03-15 PROCEDURE — 81405 MOPATH PROCEDURE LEVEL 6: CPT | Performed by: PEDIATRICS

## 2017-03-15 PROCEDURE — 81404 MOPATH PROCEDURE LEVEL 5: CPT | Performed by: PEDIATRICS

## 2017-03-15 PROCEDURE — 81406 MOPATH PROCEDURE LEVEL 7: CPT | Performed by: PEDIATRICS

## 2017-03-15 PROCEDURE — 81401 MOPATH PROCEDURE LEVEL 2: CPT | Performed by: PEDIATRICS

## 2017-03-15 PROCEDURE — 81479 UNLISTED MOLECULAR PATHOLOGY: CPT | Performed by: PEDIATRICS

## 2017-03-15 PROCEDURE — 81405 MOPATH PROCEDURE LEVEL 6: CPT

## 2017-03-15 PROCEDURE — 81407 MOPATH PROCEDURE LEVEL 8: CPT | Mod: 91

## 2017-03-15 PROCEDURE — 81407 MOPATH PROCEDURE LEVEL 8: CPT | Performed by: PEDIATRICS

## 2017-03-15 PROCEDURE — 40000803 ZZHCL STATISTIC DNA ISOL HIGH PURITY: Performed by: PEDIATRICS

## 2017-03-15 PROCEDURE — 81406 MOPATH PROCEDURE LEVEL 7: CPT

## 2017-03-15 PROCEDURE — 81404 MOPATH PROCEDURE LEVEL 5: CPT | Mod: 91

## 2017-03-15 NOTE — TELEPHONE ENCOUNTER
Notified patient that prior authorization is not needed for his genetic testing. I informed him that once his $750 deductible is met, he would be covered 100%. Barry has not accumulated anything towards his deductible yet. I informed him that he would be paying $750 for this testing. Barry expressed understanding and stated he wants to proceed with testing. We will call when results are available.    HARLAN Mercado    Division of Genetics and Metabolism  [phone]

## 2017-03-28 DIAGNOSIS — R11.15 NON-INTRACTABLE CYCLICAL VOMITING WITH NAUSEA: Primary | ICD-10-CM

## 2017-03-28 DIAGNOSIS — M62.82 NON-TRAUMATIC RHABDOMYOLYSIS: ICD-10-CM

## 2017-04-12 LAB — COPATH REPORT: NORMAL

## 2017-04-17 ENCOUNTER — TELEPHONE (OUTPATIENT)
Dept: PEDIATRICS | Facility: CLINIC | Age: 42
End: 2017-04-17

## 2018-01-05 LAB — COPATH REPORT: NORMAL

## 2025-04-08 ENCOUNTER — LAB REQUISITION (OUTPATIENT)
Dept: LAB | Facility: CLINIC | Age: 50
End: 2025-04-08

## 2025-04-14 LAB
PATH REPORT.COMMENTS IMP SPEC: NORMAL
PATH REPORT.FINAL DX SPEC: NORMAL
PATH REPORT.GROSS SPEC: NORMAL
PATH REPORT.MICROSCOPIC SPEC OTHER STN: NORMAL
PATH REPORT.RELEVANT HX SPEC: NORMAL
PATH REPORT.RELEVANT HX SPEC: NORMAL
PATH REPORT.SITE OF ORIGIN SPEC: NORMAL